# Patient Record
Sex: FEMALE | Race: WHITE | Employment: UNEMPLOYED | URBAN - METROPOLITAN AREA
[De-identification: names, ages, dates, MRNs, and addresses within clinical notes are randomized per-mention and may not be internally consistent; named-entity substitution may affect disease eponyms.]

---

## 2017-10-12 PROBLEM — E66.01 OBESITIES, MORBID (HCC): Status: ACTIVE | Noted: 2017-10-12

## 2017-10-12 PROBLEM — F32.A DEPRESSION: Status: ACTIVE | Noted: 2017-10-12

## 2017-10-12 PROBLEM — N92.1 MENORRHAGIA WITH IRREGULAR CYCLE: Status: ACTIVE | Noted: 2017-10-12

## 2017-10-12 PROBLEM — E03.9 HYPOTHYROIDISM: Status: ACTIVE | Noted: 2017-10-12

## 2017-10-12 PROBLEM — Z81.8 FAMILY HISTORY OF MAJOR DEPRESSION: Status: ACTIVE | Noted: 2017-10-12

## 2018-01-11 PROBLEM — R73.03 PREDIABETES: Status: ACTIVE | Noted: 2018-01-11

## 2018-07-05 PROBLEM — R73.9 ELEVATED BLOOD SUGAR: Status: ACTIVE | Noted: 2018-07-05

## 2018-07-18 ENCOUNTER — HOSPITAL ENCOUNTER (OUTPATIENT)
Dept: ULTRASOUND IMAGING | Age: 38
Discharge: HOME | End: 2018-07-18
Attending: OBSTETRICS & GYNECOLOGY
Payer: MEDICAID

## 2018-07-18 DIAGNOSIS — N92.1 MENORRHAGIA WITH IRREGULAR CYCLE: ICD-10-CM

## 2018-07-18 PROCEDURE — 76830 TRANSVAGINAL US NON-OB: CPT

## 2018-07-23 NOTE — PROGRESS NOTES
Patient to CT room for procedure. Patient awake and alert, verbalized name, , and procedure to be performed. Patient moved to procedure table independently. Please let patient know results are normal, can make appt with provider if wants to discuss.

## 2022-03-18 PROBLEM — N92.1 MENORRHAGIA WITH IRREGULAR CYCLE: Status: ACTIVE | Noted: 2017-10-12

## 2022-03-19 PROBLEM — R73.03 PREDIABETES: Status: ACTIVE | Noted: 2018-01-11

## 2022-03-19 PROBLEM — F32.A DEPRESSION: Status: ACTIVE | Noted: 2017-10-12

## 2022-03-19 PROBLEM — E03.9 HYPOTHYROIDISM: Status: ACTIVE | Noted: 2017-10-12

## 2022-03-19 PROBLEM — E66.01 OBESITIES, MORBID (HCC): Status: ACTIVE | Noted: 2017-10-12

## 2022-03-19 PROBLEM — Z81.8 FAMILY HISTORY OF MAJOR DEPRESSION: Status: ACTIVE | Noted: 2017-10-12

## 2022-03-20 PROBLEM — R73.9 ELEVATED BLOOD SUGAR: Status: ACTIVE | Noted: 2018-07-05

## 2023-02-20 ENCOUNTER — PATIENT MESSAGE (OUTPATIENT)
Dept: PRIMARY CARE CLINIC | Facility: CLINIC | Age: 43
End: 2023-02-20

## 2023-02-21 NOTE — TELEPHONE ENCOUNTER
From: Jerad Salgado  To: Dr. Tara Henriquezty: 2/20/2023 9:55 PM EST  Subject: Question regarding US PELVIS COMPLETE TV NON OB    Is this ok

## 2023-02-24 NOTE — TELEPHONE ENCOUNTER
Spoke with patient,   Patient refused to schedule an appointment because she does not have insurance. Pt has not been seen since 2018. Pt is requesting the results on an US that she had done at [facility].  Pt wanted to know why she could not get the results if it was something serious or cancer. I told patient that after she had the test she needed to follow up with the doctor that ordered the test or her primary doctor. Pt got upset and hang up on me.